# Patient Record
Sex: MALE | Race: WHITE | Employment: FULL TIME | URBAN - NONMETROPOLITAN AREA
[De-identification: names, ages, dates, MRNs, and addresses within clinical notes are randomized per-mention and may not be internally consistent; named-entity substitution may affect disease eponyms.]

---

## 2024-03-11 ENCOUNTER — TELEPHONE (OUTPATIENT)
Dept: HEMATOLOGY | Age: 54
End: 2024-03-11

## 2024-03-12 RX ORDER — PANTOPRAZOLE SODIUM 40 MG/1
40 TABLET, DELAYED RELEASE ORAL DAILY
COMMUNITY

## 2024-03-12 RX ORDER — FENOFIBRATE 145 MG/1
145 TABLET, COATED ORAL DAILY
COMMUNITY

## 2024-03-12 RX ORDER — ACYCLOVIR 400 MG/1
400 TABLET ORAL DAILY
COMMUNITY

## 2024-03-12 RX ORDER — ERGOCALCIFEROL 1.25 MG/1
50000 CAPSULE ORAL WEEKLY
COMMUNITY

## 2024-03-12 RX ORDER — LOSARTAN POTASSIUM 25 MG/1
25 TABLET ORAL DAILY
COMMUNITY

## 2024-03-12 NOTE — PROGRESS NOTES
OP HEMATOLOGY/ONCOLOGY CONSULTATION      Pt Name: Atul Payton  YOB: 1970  MRN: [id number]  Referring provider: Burton Murillo PA-C   PCP: Harjeet Delaney MD      Date of evaluation: 3/13/2024   History Obtained From:  patient, electronic medical record    CHIEF COMPLAINT:    Chief Complaint   Patient presents with    New Patient     Thalassemia, Low HGB     HISTORY OF PRESENT ILLNESS:    Atul Payton (AILIN) is a 54 y.o.  male referred from [facility] for evaluation of thalassemia.     Ailin reports that his mother was diagnosed with thalassemia many years ago.  He reports that he believes his sister has thalassemia.  He has never himself been formally diagnosed with thalassemia.  He has not had any transfusions.  He has developed progressive mild anemia and had requested evaluation.    Serology 2/5/2024  Serum FE-94  Ferritin-358    [id number]    CBC-HGB 11.2, HCT 35.4, MCV 67,   CMP-WNL  TSH-3.130    Colonoscopy 1/1/2021 at [facility] per Dr. Jaspal Cardona was normal.    EGD 1/1/2021 at [facility] per Dr. Jaspal Cardona revealed bleeding at the gastric junction,biopsied. Pathology-Columnar gastroesophageal junction with moderate chronic active inflammation.Negative for intestinal metaplasia.Negative dysplasia.     He does have issues with GERD and is on pantoprazole 40 mg daily.  He has essential hypertension on losartan 25 daily.  He takes fenofibrate 145 mg daily for hyperlipidemia.      Past Medical History:   Diagnosis Date    Beta 0 thalassemia (HCC)     GERD (gastroesophageal reflux disease)     Hyperlipidemia     Hypertension        Past Surgical History:   Procedure Laterality Date    COLONOSCOPY  01/01/2021    ESOPHAGOGASTRODUODENOSCOPY  01/01/2021    KNEE SURGERY  2008    UPPER GASTROINTESTINAL ENDOSCOPY  2021         Current Outpatient Medications:     vitamin B-12 (CYANOCOBALAMIN) 1000 MCG tablet, Take 1 tablet by mouth in the morning and at bedtime, Disp: ,

## 2024-03-13 ENCOUNTER — HOSPITAL ENCOUNTER (OUTPATIENT)
Dept: INFUSION THERAPY | Age: 54
Discharge: HOME | End: 2024-03-13
Payer: COMMERCIAL

## 2024-03-13 ENCOUNTER — OFFICE VISIT (OUTPATIENT)
Dept: HEMATOLOGY | Age: 54
End: 2024-03-13
Payer: COMMERCIAL

## 2024-03-13 VITALS
OXYGEN SATURATION: 98 % | BODY MASS INDEX: 29.51 KG/M2 | HEART RATE: 87 BPM | SYSTOLIC BLOOD PRESSURE: 140 MMHG | WEIGHT: 188 LBS | HEIGHT: 67 IN | DIASTOLIC BLOOD PRESSURE: 90 MMHG | TEMPERATURE: 98.8 F

## 2024-03-13 DIAGNOSIS — D56.9 THALASSEMIA, UNSPECIFIED TYPE: ICD-10-CM

## 2024-03-13 DIAGNOSIS — D56.9 THALASSEMIA, UNSPECIFIED TYPE: Primary | ICD-10-CM

## 2024-03-13 LAB
ALBUMIN SERPL-MCNC: 4.6 G/DL (ref 3.5–5.2)
ALP SERPL-CCNC: 40 U/L (ref 40–130)
ALT SERPL-CCNC: 38 U/L (ref 21–72)
ANION GAP SERPL CALCULATED.3IONS-SCNC: 9 MMOL/L (ref 7–19)
AST SERPL-CCNC: 37 U/L (ref 17–59)
BASOPHILS # BLD: 0.02 K/UL (ref 0.01–0.08)
BASOPHILS NFR BLD: 0.4 % (ref 0.1–1.2)
BILIRUB SERPL-MCNC: 1 MG/DL (ref 0.2–1.3)
BUN SERPL-MCNC: 11 MG/DL (ref 9–20)
CALCIUM SERPL-MCNC: 9.4 MG/DL (ref 8.4–10.2)
CHLORIDE SERPL-SCNC: 103 MMOL/L (ref 98–111)
CO2 SERPL-SCNC: 26 MMOL/L (ref 22–29)
CREAT SERPL-MCNC: 0.9 MG/DL (ref 0.6–1.2)
EOSINOPHIL # BLD: 0.07 K/UL (ref 0.04–0.54)
EOSINOPHIL NFR BLD: 1.5 % (ref 0.7–7)
ERYTHROCYTE [DISTWIDTH] IN BLOOD BY AUTOMATED COUNT: 15.5 % (ref 11.6–14.4)
FERRITIN SERPL-MCNC: 428.8 NG/ML (ref 30–400)
FOLATE SERPL-MCNC: 17 NG/ML (ref 4.5–32.2)
GLOBULIN: 2.7 G/DL
GLUCOSE SERPL-MCNC: 119 MG/DL (ref 74–106)
HAPTOGLOB SERPL-MCNC: 79 MG/DL (ref 30–200)
HCT VFR BLD AUTO: 34.6 % (ref 40.1–51)
HGB BLD-MCNC: 11.1 G/DL (ref 13.7–17.5)
IRON SATN MFR SERPL: 43 % (ref 14–50)
IRON SERPL-MCNC: 153 UG/DL (ref 59–158)
LDH SERPL-CCNC: 177 U/L (ref 120–246)
LYMPHOCYTES # BLD: 1.66 K/UL (ref 1.18–3.74)
LYMPHOCYTES NFR BLD: 35.9 % (ref 19.3–53.1)
MCH RBC QN AUTO: 21.1 PG (ref 25.7–32.2)
MCHC RBC AUTO-ENTMCNC: 32.1 G/DL (ref 32.3–36.5)
MCV RBC AUTO: 65.7 FL (ref 79–92.2)
MONOCYTES # BLD: 0.32 K/UL (ref 0.24–0.82)
MONOCYTES NFR BLD: 6.9 % (ref 4.7–12.5)
NEUTROPHILS # BLD: 2.54 K/UL (ref 1.56–6.13)
NEUTS SEG NFR BLD: 55.1 % (ref 34–71.1)
PLATELET # BLD AUTO: 300 K/UL (ref 163–337)
PMV BLD AUTO: 9.8 FL (ref 7.4–10.4)
POTASSIUM SERPL-SCNC: 3.7 MMOL/L (ref 3.5–5.1)
PROT SERPL-MCNC: 7.2 G/DL (ref 6.3–8.2)
RBC # BLD AUTO: 5.27 M/UL (ref 4.63–6.08)
SODIUM SERPL-SCNC: 138 MMOL/L (ref 137–145)
TIBC SERPL-MCNC: 358 UG/DL (ref 250–400)
VIT B12 SERPL-MCNC: 1718 PG/ML (ref 211–946)
WBC # BLD AUTO: 4.62 K/UL (ref 4.23–9.07)

## 2024-03-13 PROCEDURE — 80053 COMPREHEN METABOLIC PANEL: CPT

## 2024-03-13 PROCEDURE — 99202 OFFICE O/P NEW SF 15 MIN: CPT

## 2024-03-13 PROCEDURE — 36415 COLL VENOUS BLD VENIPUNCTURE: CPT

## 2024-03-13 PROCEDURE — 83615 LACTATE (LD) (LDH) ENZYME: CPT

## 2024-03-13 PROCEDURE — 85025 COMPLETE CBC W/AUTO DIFF WBC: CPT

## 2024-03-13 PROCEDURE — 99203 OFFICE O/P NEW LOW 30 MIN: CPT | Performed by: PHYSICIAN ASSISTANT

## 2024-03-13 RX ORDER — M-VIT,TX,IRON,MINS/CALC/FOLIC 27MG-0.4MG
1 TABLET ORAL DAILY
COMMUNITY

## 2024-03-13 RX ORDER — LANOLIN ALCOHOL/MO/W.PET/CERES
1000 CREAM (GRAM) TOPICAL 2 TIMES DAILY
COMMUNITY

## 2024-03-13 ASSESSMENT — PROMIS GLOBAL HEALTH SCALE
SUM OF RESPONSES TO QUESTIONS 2, 4, 5, & 10: 16
IN THE PAST 7 DAYS, HOW WOULD YOU RATE YOUR PAIN ON AVERAGE [ON A SCALE FROM 0 (NO PAIN) TO 10 (WORST IMAGINABLE PAIN)]?: 3
IN THE PAST 7 DAYS, HOW OFTEN HAVE YOU BEEN BOTHERED BY EMOTIONAL PROBLEMS, SUCH AS FEELING ANXIOUS, DEPRESSED, OR IRRITABLE [ON A SCALE FROM 1 (NEVER) TO 5 (ALWAYS)]?: 4
IN GENERAL, HOW WOULD YOU RATE YOUR MENTAL HEALTH, INCLUDING YOUR MOOD AND YOUR ABILITY TO THINK [ON A SCALE OF 1 (POOR) TO 5 (EXCELLENT)]?: 4
IN THE PAST 7 DAYS, HOW WOULD YOU RATE YOUR FATIGUE ON AVERAGE [ON A SCALE FROM 1 (NONE) TO 5 (VERY SEVERE)]?: 4
IN GENERAL, WOULD YOU SAY YOUR HEALTH IS...[ON A SCALE OF 1 (POOR) TO 5 (EXCELLENT)]: 4
SUM OF RESPONSES TO QUESTIONS 3, 6, 7, & 8: 16
IN GENERAL, WOULD YOU SAY YOUR QUALITY OF LIFE IS...[ON A SCALE OF 1 (POOR) TO 5 (EXCELLENT)]: 4
IN GENERAL, HOW WOULD YOU RATE YOUR SATISFACTION WITH YOUR SOCIAL ACTIVITIES AND RELATIONSHIPS [ON A SCALE OF 1 (POOR) TO 5 (EXCELLENT)]?: 4
TO WHAT EXTENT ARE YOU ABLE TO CARRY OUT YOUR EVERYDAY PHYSICAL ACTIVITIES SUCH AS WALKING, CLIMBING STAIRS, CARRYING GROCERIES, OR MOVING A CHAIR [ON A SCALE OF 1 (NOT AT ALL) TO 5 (COMPLETELY)]?: 5
IN GENERAL, HOW WOULD YOU RATE YOUR PHYSICAL HEALTH [ON A SCALE OF 1 (POOR) TO 5 (EXCELLENT)]?: 4
IN GENERAL, PLEASE RATE HOW WELL YOU CARRY OUT YOUR USUAL SOCIAL ACTIVITIES (INCLUDES ACTIVITIES AT HOME, AT WORK, AND IN YOUR COMMUNITY, AND RESPONSIBILITIES AS A PARENT, CHILD, SPOUSE, EMPLOYEE, FRIEND, ETC) [ON A SCALE OF 1 (POOR) TO 5 (EXCELLENT)]?: 4

## 2024-03-13 ASSESSMENT — ENCOUNTER SYMPTOMS
WHEEZING: 0
BLOOD IN STOOL: 0
EYE ITCHING: 0
TROUBLE SWALLOWING: 0
SHORTNESS OF BREATH: 0
EYE DISCHARGE: 0
VOMITING: 0
PHOTOPHOBIA: 0
COUGH: 0
ABDOMINAL DISTENTION: 0
COLOR CHANGE: 0
DIARRHEA: 0
CONSTIPATION: 0
SORE THROAT: 0
VOICE CHANGE: 0
BACK PAIN: 0
NAUSEA: 0
ABDOMINAL PAIN: 0

## 2024-03-15 LAB
HGB A1 MFR BLD: 92.4 % (ref 95–97.9)
HGB A2 MFR BLD: 5.4 % (ref 2–3.5)
HGB C MFR BLD: 0 % (ref 0–0)
HGB E MFR BLD: 0 % (ref 0–0)
HGB F MFR BLD: 2.2 % (ref 0–2.1)
HGB FRACT BLD ELPH-IMP: ABNORMAL
HGB OTHER MFR BLD: 0 % (ref 0–0)
HGB S BLD QL SOLY: ABNORMAL
HGB S MFR BLD: 0 % (ref 0–0)
PATH INTERP BLD-IMP: ABNORMAL

## 2024-03-17 LAB
ALBUMIN SERPL-MCNC: 4.86 G/DL (ref 3.75–5.01)
ALPHA1 GLOB SERPL ELPH-MCNC: 0.25 G/DL (ref 0.19–0.46)
ALPHA2 GLOB SERPL ELPH-MCNC: 0.4 G/DL (ref 0.48–1.05)
B-GLOBULIN SERPL ELPH-MCNC: 0.82 G/DL (ref 0.48–1.1)
DEPRECATED KAPPA LC FREE/LAMBDA SER: 1.5 {RATIO} (ref 0.26–1.65)
EER MONOCLONAL PROTEIN AND FLC, SERUM: ABNORMAL
GAMMA GLOB SERPL ELPH-MCNC: 0.86 G/DL (ref 0.62–1.51)
IGA SERPL-MCNC: 169 MG/DL (ref 68–408)
IGG SERPL-MCNC: 843 MG/DL (ref 768–1632)
IGM SERPL-MCNC: 73 MG/DL (ref 35–263)
INTERPRETATION SERPL IFE-IMP: ABNORMAL
INTERPRETATION SERPL IFE-IMP: ABNORMAL
KAPPA LC FREE SER-MCNC: 14.36 MG/L (ref 3.3–19.4)
LAMBDA LC FREE SERPL-MCNC: 9.56 MG/L (ref 5.71–26.3)
MONOCLONAL PROTEIN, SERUM: ABNORMAL G/DL
PROT SERPL-MCNC: 7.2 G/DL (ref 6.3–8.2)

## 2024-06-07 ENCOUNTER — TELEPHONE (OUTPATIENT)
Dept: HEMATOLOGY | Age: 54
End: 2024-06-07

## 2024-06-07 NOTE — TELEPHONE ENCOUNTER
I called and reminded pt. of their appt on 06/13/2024. Patient is aware to come at time of appt and not lab appt time. Patient confirmed appt date and time.

## 2024-06-12 NOTE — PROGRESS NOTES
Progress Note      Pt Name: Atul Payton  YOB: 1970  MRN: [id number]    Date of evaluation: 6/13/2024  History Obtained From:  patient, electronic medical record    CHIEF COMPLAINT:    Chief Complaint   Patient presents with    Follow-up     Thalassemia, unspecified type     Current active problems  Beta thalassemia minor    HISTORY OF PRESENT ILLNESS:    Atul Payton (MIGUEL) is a 54 y.o.  male followed in the office since 3/13/2021.  He does have beta thalassemia minor.  Baseline serology was obtained.  He has not had any transfusions.  He is on B12 1000 mcg daily.    He does have issues with GERD and is on pantoprazole 40 mg daily.  He has essential hypertension on losartan 25 daily.  He takes fenofibrate 145 mg daily for hyperlipidemia.      HEMATOLOGY HISTORY: Beta thalassemia minor  Miguel was seen in initial hematology consultation on 3/13/2024 referred from [facility] for evaluation of thalassemia.      Miguel reports that his mother was diagnosed with thalassemia many years ago.  He reports that he believes his sister has thalassemia.  He has never himself been formally diagnosed with thalassemia.  He has not had any transfusions.  He has developed progressive mild anemia and had requested evaluation.     Serology 2/5/2024  Serum FE-94  Ferritin-358     [id number]     CBC-HGB 11.2, HCT 35.4, MCV 67,   CMP-WNL  TSH-3.130     Colonoscopy 1/1/2021 at [facility] per Dr. Jaspal Cardona was normal.     EGD 1/1/2021 at [facility] per Dr. Jaspal Cardona revealed bleeding at the gastric junction,biopsied. Pathology-Columnar gastroesophageal junction with moderate chronic active inflammation.Negative for intestinal metaplasia.Negative dysplasia.      Serology 3/13/2024  Serum FE-153  TIBC-358  FE sat-43%  Ferritin-428.8    [id number]  Folate-17.0    LDH-177  Haptoglobin-79.0    SPEP-No M spike, Negative immunofixation  QI-IgG 843, IgA 169, IgM 73  Free serum light chains- Kappa

## 2024-06-13 ENCOUNTER — HOSPITAL ENCOUNTER (OUTPATIENT)
Dept: INFUSION THERAPY | Age: 54
Discharge: HOME | End: 2024-06-13
Payer: COMMERCIAL

## 2024-06-13 ENCOUNTER — OFFICE VISIT (OUTPATIENT)
Dept: HEMATOLOGY | Age: 54
End: 2024-06-13
Payer: COMMERCIAL

## 2024-06-13 VITALS
BODY MASS INDEX: 30.29 KG/M2 | TEMPERATURE: 98.4 F | DIASTOLIC BLOOD PRESSURE: 84 MMHG | HEART RATE: 81 BPM | SYSTOLIC BLOOD PRESSURE: 130 MMHG | WEIGHT: 193 LBS | HEIGHT: 67 IN | OXYGEN SATURATION: 95 %

## 2024-06-13 DIAGNOSIS — D53.1 MEGALOBLASTIC ANEMIA: ICD-10-CM

## 2024-06-13 DIAGNOSIS — D56.9 THALASSEMIA, UNSPECIFIED TYPE: ICD-10-CM

## 2024-06-13 DIAGNOSIS — D56.3 BETA THALASSEMIA MINOR: Primary | ICD-10-CM

## 2024-06-13 LAB
BASOPHILS # BLD: 0.02 K/UL (ref 0.01–0.08)
BASOPHILS NFR BLD: 0.5 % (ref 0.1–1.2)
EOSINOPHIL # BLD: 0.05 K/UL (ref 0.04–0.54)
EOSINOPHIL NFR BLD: 1.3 % (ref 0.7–7)
ERYTHROCYTE [DISTWIDTH] IN BLOOD BY AUTOMATED COUNT: 15.5 % (ref 11.6–14.4)
HCT VFR BLD AUTO: 33 % (ref 40.1–51)
HGB BLD-MCNC: 10.7 G/DL (ref 13.7–17.5)
LYMPHOCYTES # BLD: 1.01 K/UL (ref 1.18–3.74)
LYMPHOCYTES NFR BLD: 26.7 % (ref 19.3–53.1)
MCH RBC QN AUTO: 21.6 PG (ref 25.7–32.2)
MCHC RBC AUTO-ENTMCNC: 32.4 G/DL (ref 32.3–36.5)
MCV RBC AUTO: 66.5 FL (ref 79–92.2)
MONOCYTES # BLD: 0.27 K/UL (ref 0.24–0.82)
MONOCYTES NFR BLD: 7.1 % (ref 4.7–12.5)
NEUTROPHILS # BLD: 2.42 K/UL (ref 1.56–6.13)
NEUTS SEG NFR BLD: 64.1 % (ref 34–71.1)
PLATELET # BLD AUTO: 271 K/UL (ref 163–337)
PMV BLD AUTO: 9.7 FL (ref 7.4–10.4)
RBC # BLD AUTO: 4.96 M/UL (ref 4.63–6.08)
WBC # BLD AUTO: 3.78 K/UL (ref 4.23–9.07)

## 2024-06-13 PROCEDURE — 99212 OFFICE O/P EST SF 10 MIN: CPT

## 2024-06-13 PROCEDURE — 99213 OFFICE O/P EST LOW 20 MIN: CPT | Performed by: PHYSICIAN ASSISTANT

## 2024-06-13 PROCEDURE — 36415 COLL VENOUS BLD VENIPUNCTURE: CPT

## 2024-06-13 PROCEDURE — 85025 COMPLETE CBC W/AUTO DIFF WBC: CPT

## 2024-06-13 ASSESSMENT — ENCOUNTER SYMPTOMS
COLOR CHANGE: 0
TROUBLE SWALLOWING: 0
VOICE CHANGE: 0
CONSTIPATION: 0
ABDOMINAL DISTENTION: 0
BLOOD IN STOOL: 0
ABDOMINAL PAIN: 0
VOMITING: 0
COUGH: 0
EYE ITCHING: 0
BACK PAIN: 0
EYE DISCHARGE: 0
DIARRHEA: 0
SORE THROAT: 0
SHORTNESS OF BREATH: 0
PHOTOPHOBIA: 0
NAUSEA: 0
WHEEZING: 0

## 2024-06-16 LAB
COPPER SERPL-MCNC: 67.1 UG/DL (ref 70–140)
ZINC SERPL-MCNC: 89.3 UG/DL (ref 60–120)

## 2024-06-17 ENCOUNTER — TELEPHONE (OUTPATIENT)
Dept: HEMATOLOGY | Age: 54
End: 2024-06-17

## 2024-06-17 NOTE — TELEPHONE ENCOUNTER
----- Message from Burton Murillo PA-C sent at 6/17/2024  7:52 AM CDT -----  Start copper replacement-2 mg daily.  Can either get at [facility] or from [facility]

## 2024-12-09 ENCOUNTER — TELEPHONE (OUTPATIENT)
Dept: HEMATOLOGY | Age: 54
End: 2024-12-09

## 2024-12-09 NOTE — TELEPHONE ENCOUNTER
I called patient and reminded patient of their appt on 12/11/24 and patient informed me they needed to reschedule their appointment to another date. I have let the  know of this as well as the provider and nurses.

## 2025-03-24 ENCOUNTER — TELEPHONE (OUTPATIENT)
Dept: HEMATOLOGY | Age: 55
End: 2025-03-24

## 2025-03-24 NOTE — TELEPHONE ENCOUNTER
Called Patient and reminded patient of their appointment on 03/27/2025 and patient confirmed they would be here. Reminded patient to just come at appointment time, and to not come at the lab appointment time. Reminded patient that we will not check them in any more than 30 minutes before appointment time.  We have now moved to the [facility] [place] that is located between our old office and the ER at the [facility]. Letting the Pt know that our front entrance faces the  [employer] [place]. Reminded pt to eat well and be well hydrated for their labs.

## 2025-03-26 DIAGNOSIS — D53.1 MEGALOBLASTIC ANEMIA: ICD-10-CM

## 2025-03-26 DIAGNOSIS — E61.0 COPPER DEFICIENCY: ICD-10-CM

## 2025-03-26 DIAGNOSIS — D56.3 BETA THALASSEMIA MINOR: Primary | ICD-10-CM

## 2025-03-26 DIAGNOSIS — D56.9 THALASSEMIA, UNSPECIFIED TYPE: ICD-10-CM

## 2025-03-26 NOTE — PROGRESS NOTES
Progress Note      Pt Name: Atul Payton  YOB: 1970  MRN: [id number]    Date of evaluation: 3/27/2025  History Obtained From:  patient, electronic medical record    CHIEF COMPLAINT:    Chief Complaint   Patient presents with    Follow-up     Thalassemia, unspecified type  Beta thalassemia minor     Current active problems  Beta thalassemia minor  History of Present Illness  Atul Payton (AILIN) is a 55 y.o.  male followed in the office since 3/13/2021 with mild beta thalassemia minor.  He was also found to have copper deficiency and was to start copper 2 mg daily.  He has not been taking any copper supplements recently. He discontinued his B12 supplement due to elevated levels and is considering resuming it at a lower dose.    He experienced a respiratory illness approximately 3 weeks to a month ago, which was initially suspected to be COVID-19 but tested negative. He also tested negative for influenza and strep. During this period, he developed sinusitis and subsequently lost his sense of smell and taste. He was prescribed steroids and antibiotics, which he has since completed.    His PSA levels have not been checked this year. He was scheduled for an annual visit but was advised to postpone it for 2 weeks following a steroid injection. It has now been 3 weeks since the injection, and he has not yet returned for the visit.    He is currently under the care of a nurse practitioner. He is on losartan 25 mg daily for blood pressure management and fenofibrate 145 mg for lipid control. He is also taking pantoprazole for indigestion.    MEDICATIONS  Current: losartan, fenofibrate, pantoprazole  Discontinued: B12      HEMATOLOGY HISTORY: Beta thalassemia minor  Ailin was seen in initial hematology consultation on 3/13/2024 referred from [facility] for evaluation of thalassemia.      Ailin reports that his mother was diagnosed with thalassemia many years ago.  He reports that

## 2025-03-27 ENCOUNTER — OFFICE VISIT (OUTPATIENT)
Dept: HEMATOLOGY | Age: 55
End: 2025-03-27
Payer: COMMERCIAL

## 2025-03-27 ENCOUNTER — HOSPITAL ENCOUNTER (OUTPATIENT)
Dept: INFUSION THERAPY | Age: 55
Discharge: HOME | End: 2025-03-27
Payer: COMMERCIAL

## 2025-03-27 VITALS
DIASTOLIC BLOOD PRESSURE: 80 MMHG | BODY MASS INDEX: 31.22 KG/M2 | OXYGEN SATURATION: 97 % | WEIGHT: 198.9 LBS | SYSTOLIC BLOOD PRESSURE: 132 MMHG | HEIGHT: 67 IN | TEMPERATURE: 97.9 F | HEART RATE: 74 BPM

## 2025-03-27 DIAGNOSIS — D56.3 BETA THALASSEMIA MINOR: Primary | ICD-10-CM

## 2025-03-27 DIAGNOSIS — D56.3 BETA THALASSEMIA MINOR: ICD-10-CM

## 2025-03-27 DIAGNOSIS — D53.1 MEGALOBLASTIC ANEMIA: ICD-10-CM

## 2025-03-27 DIAGNOSIS — Z12.5 ENCOUNTER FOR SCREENING FOR MALIGNANT NEOPLASM OF PROSTATE: ICD-10-CM

## 2025-03-27 DIAGNOSIS — E61.0 COPPER DEFICIENCY: ICD-10-CM

## 2025-03-27 DIAGNOSIS — Z12.5 ENCOUNTER FOR SCREENING FOR MALIGNANT NEOPLASM OF PROSTATE: Primary | ICD-10-CM

## 2025-03-27 DIAGNOSIS — D56.9 THALASSEMIA, UNSPECIFIED TYPE: ICD-10-CM

## 2025-03-27 LAB
BASOPHILS # BLD: 0.02 K/UL (ref 0–0.2)
BASOPHILS NFR BLD: 0.5 % (ref 0–1)
EOSINOPHIL # BLD: 0.05 K/UL (ref 0–0.6)
EOSINOPHIL NFR BLD: 1.2 % (ref 0–5)
ERYTHROCYTE [DISTWIDTH] IN BLOOD BY AUTOMATED COUNT: 15.6 % (ref 11.5–14.5)
HCT VFR BLD AUTO: 36.5 % (ref 42–52)
HGB BLD-MCNC: 12.1 G/DL (ref 14–18)
LYMPHOCYTES # BLD: 1.45 K/UL (ref 1.1–4.5)
LYMPHOCYTES NFR BLD: 35.2 % (ref 20–40)
MCH RBC QN AUTO: 21.9 PG (ref 27–31)
MCHC RBC AUTO-ENTMCNC: 33.2 G/DL (ref 33–37)
MCV RBC AUTO: 66.1 FL (ref 80–94)
MONOCYTES # BLD: 0.33 K/UL (ref 0–0.9)
MONOCYTES NFR BLD: 8 % (ref 1–10)
NEUTROPHILS # BLD: 2.26 K/UL (ref 1.5–7.5)
NEUTS SEG NFR BLD: 54.9 % (ref 50–65)
PLATELET # BLD AUTO: 256 K/UL (ref 130–400)
PMV BLD AUTO: 10.2 FL (ref 9.4–12.4)
PSA SERPL-MCNC: 0.45 NG/ML (ref 0–4)
RBC # BLD AUTO: 5.52 M/UL (ref 4.7–6.1)
WBC # BLD AUTO: 4.12 K/UL (ref 4.8–10.8)

## 2025-03-27 PROCEDURE — 99213 OFFICE O/P EST LOW 20 MIN: CPT | Performed by: PHYSICIAN ASSISTANT

## 2025-03-27 PROCEDURE — 84153 ASSAY OF PSA TOTAL: CPT

## 2025-03-27 PROCEDURE — 85025 COMPLETE CBC W/AUTO DIFF WBC: CPT

## 2025-03-27 PROCEDURE — 36415 COLL VENOUS BLD VENIPUNCTURE: CPT

## 2025-03-27 PROCEDURE — 99212 OFFICE O/P EST SF 10 MIN: CPT

## 2025-03-27 PROCEDURE — 82525 ASSAY OF COPPER: CPT

## 2025-03-27 RX ORDER — AZITHROMYCIN 250 MG/1
500 TABLET, FILM COATED ORAL DAILY
COMMUNITY
End: 2025-03-27 | Stop reason: ALTCHOICE

## 2025-03-27 RX ORDER — BROMPHENIRAMINE MALEATE, PSEUDOEPHEDRINE HYDROCHLORIDE, AND DEXTROMETHORPHAN HYDROBROMIDE 2; 30; 10 MG/5ML; MG/5ML; MG/5ML
5 SYRUP ORAL EVERY 4 HOURS PRN
COMMUNITY

## 2025-03-27 RX ORDER — CYCLOBENZAPRINE HCL 5 MG
5 TABLET ORAL NIGHTLY PRN
COMMUNITY

## 2025-03-27 ASSESSMENT — ENCOUNTER SYMPTOMS
NAUSEA: 0
COUGH: 0
TROUBLE SWALLOWING: 0
VOICE CHANGE: 0
VOMITING: 0
WHEEZING: 0
BLOOD IN STOOL: 0
SORE THROAT: 0
CONSTIPATION: 0
PHOTOPHOBIA: 0
EYE ITCHING: 0
ABDOMINAL PAIN: 0
EYE DISCHARGE: 0
COLOR CHANGE: 0
BACK PAIN: 0
ABDOMINAL DISTENTION: 0
SHORTNESS OF BREATH: 0
DIARRHEA: 0

## 2025-03-29 LAB — COPPER SERPL-MCNC: 57.6 UG/DL (ref 70–140)

## 2025-04-01 ENCOUNTER — RESULTS FOLLOW-UP (OUTPATIENT)
Dept: HEMATOLOGY | Age: 55
End: 2025-04-01

## 2025-04-01 NOTE — TELEPHONE ENCOUNTER
----- Message from Butron Murillo PA-C sent at 4/1/2025  8:24 AM CDT -----  See if he will start copper 2 mg daily